# Patient Record
Sex: FEMALE | Race: BLACK OR AFRICAN AMERICAN | URBAN - METROPOLITAN AREA
[De-identification: names, ages, dates, MRNs, and addresses within clinical notes are randomized per-mention and may not be internally consistent; named-entity substitution may affect disease eponyms.]

---

## 2022-12-10 ENCOUNTER — HOSPITAL ENCOUNTER (OUTPATIENT)
Dept: TELEMEDICINE | Facility: HOSPITAL | Age: 42
Discharge: HOME | End: 2022-12-10

## 2022-12-10 PROCEDURE — G0426 PR INPT TELEHEALTH CONSULT 50M: ICD-10-PCS | Mod: GT,,, | Performed by: PSYCHIATRY & NEUROLOGY

## 2022-12-10 PROCEDURE — G0426 INPT/ED TELECONSULT50: HCPCS | Mod: GT,,, | Performed by: PSYCHIATRY & NEUROLOGY

## 2022-12-10 NOTE — HPI
Location: [facility] Referring Provider: LACEY CERON MD Contact Number: [phone]       Lkn: 12/10/2022 @[facility]0 Pt Symptoms: left eye blurry vision and numbness to left arm       180/119    CT -ve

## 2022-12-11 NOTE — CONSULTS
[facility]  Vascular Neurology  Comprehensive Stroke Center  TeleVascular Neurology Acute Consultation Note      Consults    Consulting Provider: LACEY CERON  Current Providers  No providers found    Patient Location: [facility] TELEMEDICINE ED [facility] Emergency Department  [facility] hospital nurse at bedside with patient assisting consultant.     Patient information was obtained from patient.         Assessment/Plan:   42 year old patient presenting with LKN: 12/10/2022 @1420 Pt Symptoms: left eye blurry vision and numbness to left arm . She has been having headache for 2 days. Thrombolytic therapy not recommended due to suspected stroke mimic  and symptoms going on headache wise for 2 days. Her left eye exam was inconsistent as initially she was able to count fingers but afterwards it appeared blurry to her in all four quadrants in left eye. She also reports tingling in all extremities.     Ddx: TIA/stroke/hypertensive emergency/migraine with aura    CT head per ER physician is no acute intra-cranial process. I tried to retreive it and it does not show up in my PACS.She does not appear large vessel occlusion but I recommend obtaining CTA head and neck with and without contrast.    Oral aspirin 300 mg now.  Head of bed flat, IV Fluids, gradual normotension  CTA head and neck with and without contrast.  Headache treatment  Neuro consult if in house available or transfer for neuro consult  Stroke/TIA work-up with MRI brain, Echo, PT/OT/ Speech and swallow evaluation.  They will call me with CTA results if abnormal.  If CTA -ve for occlusion, recommend loading Plavix load 300 mg today per POINT/CHANCE protocol today and from tomorrow 81 mg aspirin and plavix 75 mg for 21 days followed by mono antiplatelet.     Diagnoses:   Stroke like symptoms    STROKE DOCUMENTATION     Acute Stroke Times:   Acute Stroke Times   Last Known Normal Date:  12/10/22  Last Known Normal Time: 1420  Stroke Team Called Date: 12/10/22  Stroke Team Called Time: 1523  Stroke Team Arrival Date: 12/10/22  Stroke Team Arrival Time: 1548    NIH Scale:  1a. Level of Consciousness: 0-->Alert, keenly responsive  1b. LOC Questions: 0-->Answers both questions correctly  1c. LOC Commands: 0-->Performs both tasks correctly  2. Best Gaze: 0-->Normal  3. Visual: 0-->No visual loss  4. Facial Palsy: 0-->Normal symmetrical movements  5a. Motor Arm, Left: 0-->No drift, limb holds 90 (or 45) degrees for full 10 secs  5b. Motor Arm, Right: 0-->No drift, limb holds 90 (or 45) degrees for full 10 secs  6a. Motor Leg, Left: 0-->No drift, leg holds 30 degree position for full 5 secs  6b. Motor Leg, Right: 0-->No drift, leg holds 30 degree position for full 5 secs  7. Limb Ataxia: 0-->Absent  8. Sensory: 0-->Normal, no sensory loss  9. Best Language: 0-->No aphasia, normal  10. Dysarthria: 0-->Normal  11. Extinction and Inattention (formerly Neglect): 0-->No abnormality  Total (NIH Stroke Scale): 0     Modified [place]    [place] Coma Scale:    ABCD2 Score:    [id number] Score:   HAS -BLED Score:   ICH Score:   Hunt & Jj Classification:       There were no vitals taken for this visit.  Eligible for thrombolytic therapy?: Yes  Thrombolytic therapy recomended: Thrombolytic therapy not recommended due to Suspected stroke mimic  and symptoms going on  headache wise for 2 days  Possible Interventional Revascularization Candidate? Awaiting CTA results from [facility] for determination     Disposition Recommendation: pending further studies    Subjective:     History of Present Illness:  Location: [facility] Referring Provider: LACEY CERON MD Contact Number: [phone]       Lkn: 12/10/2022 @2460 Pt Symptoms: left eye blurry vision and numbness to left arm             Woke up with symptoms?: no    Recent bleeding noted: no  Does the patient take any Blood Thinners?  no  Medications: No relevant medications      Past Medical History: hypertension    Past Surgical History: no major surgeries within the last 2 weeks    Family History: no relevant history    Social History: unable to obtain    Allergies: Allergies have not been reviewed No relevant allergies    Review of Systems  Objective:   Vitals: There were no vitals taken for this visit. BP: 180/119    CT READ: Yes  No hemmorhage. No mass effect. No early infarct signs.     Physical Exam      Recommended the emergency room physician to have a brief discussion with the patient and/or family if available regarding the  risks and benefits of treatment, and to briefly document the occurrence of that discussion in his clinical encounter note.     The attending portion of this evaluation, treatment, and documentation was performed per Karli Mclaughlin MD via audiovisual.    Billing code:  (non-intervention mild to moderate stroke, TIA, some mimics)      This patient has a critical neurological condition/illness, with some potential for high morbidity and mortality.  There is a moderate probability for acute neurological change leading to clinical and possibly life-threatening deterioration requiring highest level of physician preparedness for urgent intervention.  Care was coordinated with other physicians involved in the patient's care.  Radiologic studies and laboratory data were reviewed and interpreted, and plan of care was re-assessed based on the results.  Diagnosis, treatment options and prognosis may have been discussed with the patient and/or family members or caregiver.    In your opinion, this was a: Tier 1 Van Negative    Consult End Time: 7:58 PM     Karli Mclaughlin MD  Comprehensive Stroke Center  Vascular Neurology   [facility]

## 2022-12-11 NOTE — SUBJECTIVE & OBJECTIVE
Woke up with symptoms?: no    Recent bleeding noted: no  Does the patient take any Blood Thinners? no  Medications: No relevant medications      Past Medical History: hypertension    Past Surgical History: no major surgeries within the last 2 weeks    Family History: no relevant history    Social History: unable to obtain    Allergies: Allergies have not been reviewed No relevant allergies    Review of Systems  Objective:   Vitals: There were no vitals taken for this visit. BP: 180/119    CT READ: Yes  No hemmorhage. No mass effect. No early infarct signs.     Physical Exam